# Patient Record
Sex: MALE
[De-identification: names, ages, dates, MRNs, and addresses within clinical notes are randomized per-mention and may not be internally consistent; named-entity substitution may affect disease eponyms.]

---

## 2020-08-02 ENCOUNTER — HOSPITAL ENCOUNTER (EMERGENCY)
Dept: HOSPITAL 97 - ER | Age: 35
Discharge: HOME | End: 2020-08-02
Payer: SELF-PAY

## 2020-08-02 VITALS — TEMPERATURE: 98.4 F | OXYGEN SATURATION: 99 % | DIASTOLIC BLOOD PRESSURE: 78 MMHG | SYSTOLIC BLOOD PRESSURE: 132 MMHG

## 2020-08-02 DIAGNOSIS — Z23: ICD-10-CM
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
## 2020-08-02 DIAGNOSIS — S61.217A: Primary | ICD-10-CM

## 2020-08-02 PROCEDURE — 0JQK0ZZ REPAIR LEFT HAND SUBCUTANEOUS TISSUE AND FASCIA, OPEN APPROACH: ICD-10-PCS

## 2020-08-02 PROCEDURE — 90714 TD VACC NO PRESV 7 YRS+ IM: CPT

## 2020-08-02 PROCEDURE — 90471 IMMUNIZATION ADMIN: CPT

## 2020-08-02 PROCEDURE — 99283 EMERGENCY DEPT VISIT LOW MDM: CPT

## 2020-08-02 NOTE — EDPHYS
Physician Documentation                                                                           

 [facility]                                                                 

Name: Giorgio Lofton                                                                               

Age: 35 yrs                                                                                       

Sex: Male                                                                                         

: 1985                                                                                   

MRN: [id number]                                                                                   

Arrival Date: 2020                                                                          

Time: 10:32                                                                                       

Account#: [id number]                                                                            

Bed 19                                                                                            

Private MD:                                                                                       

ED Physician Camila Guy                                                                    

HPI:                                                                                              

                                                                                             

10:50 This 35 yrs old  Male presents to ER via Un[facility]igned with complaints of          pm1 

      Laceration To Hand.                                                                         

10:50 The patient has a laceration related to: accidentally smashed his left 5th finger with  pm1 

      a hammer resulting in a laceration occurred at home. The laceration(s) is(are) located      

      on the palmar aspect of distal phalanx of left little finger. Onset: The                    

      symptoms/episode began/occurred just prior to arrival. Associated signs and symptoms:       

      The patient has no apparent associated signs or symptoms, Pertinent negatives: numbness     

      distal to injury, suspected foreign body. The patient has not experienced similar           

      symptoms in the past. The patient has not recently seen a physician.                        

                                                                                                  

Historical:                                                                                       

- Allergies:                                                                                      

10:50 No Known Allergies;                                                                     ss  

- Home Meds:                                                                                      

10:50 None [Active];                                                                          ss  

- PMHx:                                                                                           

10:50 None;                                                                                   ss  

- PSHx:                                                                                           

10:50 None;                                                                                   ss  

                                                                                                  

- Immunization history:: Adult Immunizations up to date.                                          

- Social history:: Smoking status: Patient denies any tobacco usage or history of.                

                                                                                                  

                                                                                                  

ROS:                                                                                              

10:50 Constitutional: Negative for fever, chills, and weight loss, Cardiovascular: Negative   pm1 

      for chest pain, palpitations, and edema, Respiratory: Negative for shortness of breath,     

      cough, wheezing, and pleuritic chest pain, Abdomen/GI: Negative for abdominal pain,         

      nausea, vomiting, diarrhea, and constipation.                                               

10:50 Neuro: Negative for headache, weakness, numbness, tingling, and seizure.                    

10:50 MS/extremity: Positive for laceration, pain, of the palmar aspect of distal phalanx of      

      left little finger, Negative for decreased range of motion, deformity.                      

10:50 Skin: Positive for laceration(s), of the palmar aspect of distal phalanx of left little     

      finger.                                                                                     

                                                                                                  

Exam:                                                                                             

10:50 Constitutional:  This is a well developed, well nourished patient who is awake, alert,  pm1 

      and in no acute distress. Head/Face:  Normocephalic, atraumatic.                            

                                                                                                  

Vital Signs:                                                                                      

10:46  / 78; Pulse 70; Resp 16; Temp 98.4(TE); Pulse Ox 99% on R/A; Weight 83.01 kg;    ss  

      Height 5 ft. 5 in. (165.10 cm); Pain 0/10;                                                  

10:46 Body Mass Index 30.45 (83.01 kg, 165.10 cm)                                             ss  

                                                                                                  

Laceration:                                                                                       

11:17 Wound Repair of 1cm ( 0.4in ) subcutaneous laceration to palmar aspect of distal        pm1 

      phalanx of left little finger. Irregularly shaped.. Distal neuro/vascular/tendon            

      intact. Anesthesia: Local anesthetic administered with 2 mls of 1% lidocaine. Wound         

      prep: Extensive cleansing with hibiclenz by me, Wound irrigation with saline by me,         

      Wound explored extensively, Copious irrigation. Skin closed with 3 4-0 Prolene using        

      simple sutures and sterile technique. Dressed with Neosporin, 4x4's. Patient tolerated      

      well.                                                                                       

                                                                                                  

MDM:                                                                                              

10:43 Patient medically screened.                                                             pm1 

10:50 Data reviewed: vital signs. Data interpreted: Pulse oximetry: on room air is 99 %.      pm1 

      Interpretation: normal.                                                                     

10:50 Refusal of service: The patient/guardian displays adequate decision making capability   pm1 

      and despite a detailed discussion of alternatives, benefits, risks, and consequences        

      refuses: all X-rays, Patient just wants sutures and tetanus shot.                           

11:17 Counseling: I had a detailed discussion with the patient and/or guardian regarding: the pm1 

      historical points, exam findings, and any diagnostic results supporting the                 

      discharge/admit diagnosis, the need for outpatient follow up, a hand specialist, to         

      return to the emergency department if symptoms worsen or persist or if there are any        

      questions or concerns that arise at home.                                                   

                                                                                                  

                                                                                             

10:48 Order name: Prolene, Sutures; Complete Time: 11:04                                      pm1 

                                                                                             

10:48 Order name: Dressing - Wound; Complete Time: 11:04                                      pm1 

                                                                                             

10:48 Order name: Gloves, Sterile; Complete Time: 11:04                                       pm1 

                                                                                             

10:48 Order name: Setup Suture Tray; Complete Time: 11:04                                     pm1 

                                                                                                  

Administered Medications:                                                                         

11:04 Drug: Tetanus-Diphtheria Toxoid Adult 0.5 ml {: [employer]. Exp:           

      2023. Lot #: [id number]. } Route: IM; Site: right deltoid;                                 

11:58 Follow up: Response: No adverse reaction; RASS: Alert and Calm (0)                      ll1 

11:30 Drug: Lidocaine (1 %) 5 ml {Note: used by practioner for suture repair..} Volume: 5 ml; ll1 

      Route: Infiltration;                                                                        

11:58 Follow up: Response: No adverse reaction; RASS: Alert and Calm (0)                      ll1 

                                                                                                  

                                                                                                  

Disposition:                                                                                      

17:51 Co-signature as Attending Physician, Camila Guy MD.                               ma2 

                                                                                                  

Disposition:                                                                                      

20 11:20 Discharged to Home. Impression: Laceration without foreign body of left little     

  finger without damage to nail.                                                                  

- Condition is Stable.                                                                            

- Discharge Instructions: Laceration Care, Adult.                                                 

- Prescriptions for Keflex 500 mg Oral Capsule - take 1 capsule by ORAL route every 12            

  hours for 10 days; 20 capsule. Tramadol 50 mg Oral Tablet - take 1 tablet by ORAL               

  route every 8 hours as needed; 12 tablet.                                                       

- Medication Reconciliation Form, Thank You Letter, Antibiotic Education, Prescription            

  Opioid Use form.                                                                                

- Follow up: Emergency Department; When: As needed; Reason: Worsening of condition.               

  Follow up: Private Physician; When: 10 - 14 days; Reason: Wound Recheck, Recheck                

  today's complaints, Continuance of care, Staple/Suture removal, Re-evaluation by your           

  physician.                                                                                      

- Problem is new.                                                                                 

- Symptoms have improved.                                                                         

                                                                                                  

                                                                                                  

                                                                                                  

Signatures:                                                                                       

Keily Page RN RN                                                      

Chaka Cihno, NP                    NP   pm1                                                  

Camila Guy MD MD   ma2                                                  

Kymberly Fuller RN RN                                                      

Laura Cornelius RN                       RN   ll1                                                  

                                                                                                  

Corrections: (The following items were deleted from the chart)                                    

11:58 11:20 2020 11:20 Discharged to Home. Impression: Laceration without foreign body  ll1 

      of left little finger without damage to nail. Condition is Stable. Forms are Medication     

      Reconciliation Form, Thank You Letter, Antibiotic Education, Prescription Opioid Use.       

      Follow up: Emergency Department; When: As needed; Reason: Worsening of condition.           

      Follow up: Private Physician; When: 10 - 14 days; Reason: Wound Recheck, Recheck            

      today's complaints, Continuance of care, Staple/Suture removal, Re-evaluation by your       

      physician. Problem is new. Symptoms have improved. pm1                                      

                                                                                                  

**************************************************************************************************

## 2020-08-02 NOTE — ER
Nurse's Notes                                                                                     

 [facility]                                                                 

Name: Giorgio Lofton                                                                               

Age: 35 yrs                                                                                       

Sex: Male                                                                                         

: 1985                                                                                   

MRN: [id number]                                                                                   

Arrival Date: 2020                                                                          

Time: 10:32                                                                                       

Account#: [id number]                                                                            

Bed 19                                                                                            

Private MD:                                                                                       

Diagnosis: Laceration without foreign body of left little finger without damage to nail           

                                                                                                  

Presentation:                                                                                     

                                                                                             

10:46 Chief complaint: Patient states: smash injury to L fifth finger with hammer. Injury     ss  

      occurred 45 minutes ago. Coronavirus screen: Client denies travel out of the [place] in        

      the last 14 days. At this time, the client does not indicate any symptoms associated        

      with coronavirus-19. Ebola Screen: Patient denies exposure to infectious person.            

      Patient denies travel to an Ebola-affected area in the 21 days before illness onset.        

      Complicating Factors: There are no complicating factors for this patient. Initial           

      Sepsis Screen: Does the patient meet any 2 criteria? No. Patient's initial sepsis           

      screen is negative. Does the patient have a suspected source of infection? No.              

      Patient's initial sepsis screen is negative. Risk Assessment: Do you want to hurt           

      yourself or someone else? Patient reports no desire to harm self or others. Onset of        

      symptoms was 2020.                                                               

10:46 Method Of Arrival: Ambulatory                                                           ss  

10:46 Acuity: CALOS 4                                                                           ss  

                                                                                                  

Triage Assessment:                                                                                

11:57 General: Appears in no apparent distress. Behavior is calm, cooperative. Injury         ll1 

      Description: Laceration sustained to palmar aspect of distal phalanx of left little         

      finger.                                                                                     

                                                                                                  

Historical:                                                                                       

- Allergies:                                                                                      

10:50 No Known Allergies;                                                                     ss  

- Home Meds:                                                                                      

10:50 None [Active];                                                                          ss  

- PMHx:                                                                                           

10:50 None;                                                                                   ss  

- PSHx:                                                                                           

10:50 None;                                                                                   ss  

                                                                                                  

- Immunization history:: Adult Immunizations up to date.                                          

- Social history:: Smoking status: Patient denies any tobacco usage or history of.                

                                                                                                  

                                                                                                  

Screenin:57 Abuse screen: Denies threats or abuse. Nutritional screening: No deficits noted.        ll1 

      Tuberculosis screening: No symptoms or risk factors identified. Fall Risk None              

      identified. Total Lauren Fall Scale indicates No Risk (0-24 pts).                            

                                                                                                  

Assessment:                                                                                       

11:55 General: Appears in no apparent distress. Behavior is calm, cooperative. Pain: Denies   ll1 

      pain. Derm: sutures to 5th digit. PMS intact Reports laceration 5th digit, crush            

      injury. Musculoskeletal: Reports pain in palmar aspect of distal phalanx of left little     

      finger. Injury Description: Crush injury.                                                   

11:58 Injury Description: Laceration is 0.5 to 2.5 cm long, not bleeding.                     ll1 

                                                                                                  

Vital Signs:                                                                                      

10:46  / 78; Pulse 70; Resp 16; Temp 98.4(TE); Pulse Ox 99% on R/A; Weight 83.01 kg;    ss  

      Height 5 ft. 5 in. (165.10 cm); Pain 0/10;                                                  

10:46 Body Mass Index 30.45 (83.01 kg, 165.10 cm)                                               

                                                                                                  

ED Course:                                                                                        

10:32 Patient arrived in ED.                                                                  fj1 

10:43 Chaka Chino NP is PHCP.                                                           pm1 

10:43 Camila Guy MD is Attending Physician.                                           pm1 

10:49 Kymberly Fuller, RN is Primary Nurse.                                                         

10:50 Triage completed.                                                                         

10:50 Arm band placed on right wrist.                                                           

11:58 Patient has correct armband on for positive identification. Bed in low position. Call   ll1 

      light in reach. Side rails up X 1.                                                          

11:58 No provider procedures requiring assistance completed. Patient did not have IV access   ll1 

      during this emergency room visit.                                                           

                                                                                                  

Administered Medications:                                                                         

11:04 Drug: Tetanus-Diphtheria Toxoid Adult 0.5 ml {: [employer]. Exp:           

      2023. Lot #: [id number]. } Route: IM; Site: right deltoid;                                 

11:58 Follow up: Response: No adverse reaction; RASS: Alert and Calm (0)                      1 

11:30 Drug: Lidocaine (1 %) 5 ml {Note: used by practioner for suture repair..} Volume: 5 ml; ll1 

      Route: Infiltration;                                                                        

11:58 Follow up: Response: No adverse reaction; RASS: Alert and Calm (0)                      1 

                                                                                                  

                                                                                                  

Outcome:                                                                                          

11:20 Discharge ordered by MD.                                                                pm1 

11:58 Patient left the ED.                                                                    ll1 

11:58 Discharged to home ambulatory.                                                          ll1 

11:58 Condition: stable                                                                           

11:58 Discharge instructions given to patient, Instructed on discharge instructions, follow       

      up and referral plans. medication usage, wound care, Demonstrated understanding of          

      instructions, follow-up care, medications, wound care, Prescriptions given X 2.             

                                                                                                  

Signatures:                                                                                       

Smirch, Keily, RN                      RN   ss                                                   

Chaka Chino, NP                    NP   pm1                                                  

Luis Fernando Paulino                                 fj1                                                  

Kymberly Fuller, RN                         RN   ah                                                   

Laura Cornelius RN                       RN   ll1                                                  

                                                                                                  

**************************************************************************************************